# Patient Record
Sex: FEMALE | Race: WHITE | Employment: STUDENT | URBAN - NONMETROPOLITAN AREA
[De-identification: names, ages, dates, MRNs, and addresses within clinical notes are randomized per-mention and may not be internally consistent; named-entity substitution may affect disease eponyms.]

---

## 2024-02-14 ENCOUNTER — OFFICE VISIT (OUTPATIENT)
Dept: PRIMARY CARE CLINIC | Age: 19
End: 2024-02-14
Payer: COMMERCIAL

## 2024-02-14 VITALS
HEIGHT: 66 IN | BODY MASS INDEX: 21.38 KG/M2 | RESPIRATION RATE: 16 BRPM | TEMPERATURE: 97.7 F | HEART RATE: 71 BPM | DIASTOLIC BLOOD PRESSURE: 70 MMHG | WEIGHT: 133 LBS | OXYGEN SATURATION: 100 % | SYSTOLIC BLOOD PRESSURE: 110 MMHG

## 2024-02-14 DIAGNOSIS — J01.40 ACUTE NON-RECURRENT PANSINUSITIS: Primary | ICD-10-CM

## 2024-02-14 PROCEDURE — 99203 OFFICE O/P NEW LOW 30 MIN: CPT

## 2024-02-14 RX ORDER — AMOXICILLIN AND CLAVULANATE POTASSIUM 875; 125 MG/1; MG/1
1 TABLET, FILM COATED ORAL 2 TIMES DAILY
Qty: 20 TABLET | Refills: 0 | Status: SHIPPED | OUTPATIENT
Start: 2024-02-14 | End: 2024-02-24

## 2024-02-14 NOTE — PROGRESS NOTES
[facility] [place] [place] [facility] department of [facility]  [address] [address]  Phone: [phone]  Fax: [phone]      Marta Manuel is a 19 y.o. female who presents to the [facility] today for her medical conditions/complaints as noted below. Marta Manuel is c/o of Sore Throat (Sore throat, headache, cough, ear drainage. Started 1 week ago)          HPI:     Pharyngitis  This is a new problem. The current episode started in the past 7 days (x1 week). The problem has been waxing and waning. Associated symptoms include congestion, coughing (minimal, dry), a fever, headaches, a sore throat and swollen glands. Pertinent negatives include no nausea. The symptoms are aggravated by swallowing. Treatments tried: mucinex, tylenol. The treatment provided mild relief.       History reviewed. No pertinent past medical history.     No Known Allergies    Wt Readings from Last 3 Encounters:   02/14/24 60.3 kg (133 lb)     BP Readings from Last 3 Encounters:   02/14/24 110/70      Temp Readings from Last 3 Encounters:   02/14/24 97.7 °F (36.5 °C)     Pulse Readings from Last 3 Encounters:   02/14/24 71     SpO2 Readings from Last 3 Encounters:   02/14/24 100%       Subjective:      Review of Systems   Constitutional:  Positive for fever. Negative for appetite change.   HENT:  Positive for congestion, sinus pressure, sinus pain and sore throat.    Respiratory:  Positive for cough (minimal, dry).    Gastrointestinal:  Negative for nausea.   Neurological:  Positive for headaches.       Objective:     Vitals:    02/14/24 1829   BP: 110/70   Pulse: 71   Resp: 16   Temp: 97.7 °F (36.5 °C)   SpO2: 100%   Weight: 60.3 kg (133 lb)   Height: 1.676 m (5' 6\")     Body mass index is 21.47 kg/m².    /70   Pulse 71   Temp 97.7 °F (36.5 °C)   Resp 16   Ht 1.676 m (5' 6\")   Wt 60.3 kg (133 lb)   SpO2 100%   BMI 21.47 kg/m²   Physical Exam  Vitals reviewed.

## 2024-02-14 NOTE — PATIENT INSTRUCTIONS
Augmentin x 10 days  Encouraged to complete full course of antibiotic and use OTC Acetaminophen or Ibuprofen for symptom relief.  May use OTC chloraseptic spray and/or warm salt water gargles for symptom relief.  Follow up with PCP for routine health maintenance and return to ER or UC if symptoms worsen or fail to improve over next 2-3 days.     Recommended over the counter medications/treatments:  The use of an antihistamine (Claritin or Zyrtec) and a nasal steroid spray (Flonase or Nasacort) may help with sinus congestion and drainage.   Mucinex will also help thin secretions and improve congestion. Works best if drinking plenty of water.  Honey with or without Lemon may also help with coughing.  Probiotics daily may help boost immune system.  Alternating hot liquids with cold liquids helps to sooth sore throat  Use Acetaminophen (Tylenol) to help relieve fever, chills or body aches. If allowed, you may alternate using ibuprofen (Motrin) and Tylenol. Do not exceed 3,000mg of Tylenol in a 24 hour time period.    Make sure to stay well hydrated by drinking plenty of water.